# Patient Record
Sex: MALE | Race: BLACK OR AFRICAN AMERICAN | Employment: PART TIME | URBAN - METROPOLITAN AREA
[De-identification: names, ages, dates, MRNs, and addresses within clinical notes are randomized per-mention and may not be internally consistent; named-entity substitution may affect disease eponyms.]

---

## 2018-02-20 ENCOUNTER — AMB VIDEO VISIT (OUTPATIENT)
Dept: OTHER | Facility: HOSPITAL | Age: 23
End: 2018-02-20

## 2018-02-20 PROCEDURE — EVISIT: Performed by: FAMILY MEDICINE

## 2018-02-20 NOTE — CARE ANYWHERE EVISITS
Visit Summary for [facility] - [place]  [place] - Gender: Male - Date of Birth: [id number]  Date: [id number] - Duration: 13 minutes  Patient: ZAHIRA VÁSQUEZ  Provider: Lizzette Soares    Patient Contact Information  Address  [place]; [address]  [phone]    Visit Topics  UTI symptoms [Added By: Self - 2018-02-20]    Conversation Transcripts     [Notification] You are connected with Lizzette Soarse, Family Physician  [Notification] ZAHIRA VÁSQUEZ is located in [place]  [Notification] ZAHIRA VÁSQUEZ has shared health history         Diagnosis    Procedures  Value: [id number] Code: CPT-4 UNLISTED E&M SERVICE    Medications Prescribed    No prescriptions ordered    Electronically signed by: Kye De Leon(NPI [id number])

## 2018-05-12 ENCOUNTER — APPOINTMENT (EMERGENCY)
Dept: RADIOLOGY | Facility: HOSPITAL | Age: 23
End: 2018-05-12

## 2018-05-12 ENCOUNTER — HOSPITAL ENCOUNTER (EMERGENCY)
Facility: HOSPITAL | Age: 23
Discharge: HOME | End: 2018-05-12
Attending: EMERGENCY MEDICINE | Admitting: EMERGENCY MEDICINE

## 2018-05-12 VITALS
HEART RATE: 71 BPM | WEIGHT: 185 LBS | OXYGEN SATURATION: 99 % | SYSTOLIC BLOOD PRESSURE: 126 MMHG | RESPIRATION RATE: 20 BRPM | DIASTOLIC BLOOD PRESSURE: 68 MMHG | TEMPERATURE: 98.9 F

## 2018-05-12 DIAGNOSIS — S30.1XXA CONTUSION OF FLANK, INITIAL ENCOUNTER: Primary | ICD-10-CM

## 2018-05-12 PROCEDURE — 73502 X-RAY EXAM HIP UNI 2-3 VIEWS: CPT

## 2018-05-12 PROCEDURE — 99283 EMERGENCY DEPT VISIT LOW MDM: CPT

## 2018-05-12 RX ORDER — IBUPROFEN 600 MG/1
600 TABLET ORAL ONCE
Status: COMPLETED | OUTPATIENT
Start: 2018-05-12 | End: 2018-05-12

## 2018-05-12 RX ORDER — ACETAMINOPHEN 325 MG/1
650 TABLET ORAL ONCE
Status: COMPLETED | OUTPATIENT
Start: 2018-05-12 | End: 2018-05-12

## 2018-05-12 RX ADMIN — ACETAMINOPHEN 650 MG: 325 TABLET, FILM COATED ORAL at 20:18

## 2018-05-12 RX ADMIN — IBUPROFEN 600 MG: 600 TABLET ORAL at 20:18

## 2018-05-12 NOTE — ED PROVIDER NOTES
History  Chief Complaint   Patient presents with    Flank Injury     Pt  c/o right sided flank pain after being elbowed in that area by another player while playing basketball around 2 hours ago  25 y/o male presents today c/o right hip pain which started 2 hours ago after he was hit with another player's elbow while playing basketball  C/o pain just above his right hip  States he 'can't walk due to the pain'  History provided by:  Patient  Hip Pain   Location:  Right hip  Severity:  Moderate  Onset quality:  Sudden  Duration:  2 hours  Timing:  Constant  Progression:  Unchanged  Chronicity:  New  Associated symptoms: no abdominal pain, no chest pain, no headaches, no loss of consciousness, no nausea, no rash and no shortness of breath        None       No past medical history on file  No past surgical history on file  No family history on file  I have reviewed and agree with the history as documented  Social History   Substance Use Topics    Smoking status: Never Smoker    Smokeless tobacco: Not on file    Alcohol use No        Review of Systems   Constitutional: Negative for diaphoresis  Respiratory: Negative for shortness of breath  Cardiovascular: Negative for chest pain  Gastrointestinal: Negative for abdominal pain and nausea  Musculoskeletal: Negative for back pain  Skin: Negative for pallor, rash and wound  Neurological: Negative for loss of consciousness and headaches  Physical Exam  ED Triage Vitals [05/12/18 1840]   Temperature Pulse Respirations Blood Pressure SpO2   98 9 °F (37 2 °C) 71 20 126/68 99 %      Temp Source Heart Rate Source Patient Position - Orthostatic VS BP Location FiO2 (%)   Oral -- -- -- --      Pain Score       8           Orthostatic Vital Signs  Vitals:    05/12/18 1840   BP: 126/68   Pulse: 71       Physical Exam   Constitutional: He appears well-developed and well-nourished  HENT:   Head: Normocephalic and atraumatic  Mouth/Throat: Oropharynx is clear and moist    Pulmonary/Chest: Breath sounds normal    Abdominal: Soft  Bowel sounds are normal  He exhibits no mass  There is no tenderness  There is no rebound and no guarding  Musculoskeletal:        Legs:  Patient has a moderate sized contusion over the right iliac crest   There is no crepitus, there is no deformity  He complains of pain with range of motion of the right hip is neurovascularly intact distally  Pelvis is stable  There is no intra-abdominal injury or pain on palpation of the abdomen  Nursing note and vitals reviewed  ED Medications  Medications   acetaminophen (TYLENOL) tablet 650 mg (650 mg Oral Given 5/12/18 2018)   ibuprofen (MOTRIN) tablet 600 mg (600 mg Oral Given 5/12/18 2018)       Diagnostic Studies  Results Reviewed     None                 XR hip/pelv 2-3 vws right    (Results Pending)              Procedures  Procedures       Phone Contacts  ED Phone Contact    ED Course                               MDM  Number of Diagnoses or Management Options  Contusion of flank, initial encounter: new and requires workup  Diagnosis management comments: 8:46 PM  X-ray negative by my read  Likely contusion  Recommend RICE and follow up with PCP as an outpatient         Amount and/or Complexity of Data Reviewed  Tests in the radiology section of CPT®: ordered and reviewed    Risk of Complications, Morbidity, and/or Mortality  Presenting problems: low  Diagnostic procedures: low  Management options: low    Patient Progress  Patient progress: stable    CritCare Time    Disposition  Final diagnoses:   Contusion of flank, initial encounter     Time reflects when diagnosis was documented in both MDM as applicable and the Disposition within this note     Time User Action Codes Description Comment    5/12/2018  8:19 PM Salazar Cook Add [S30 1XXA] Contusion of flank, initial encounter       ED Disposition     ED Disposition Condition Comment    Discharge Jovan Owen discharge to home/self care  Condition at discharge: Stable        Follow-up Information     Follow up With Specialties Details Why Contact Info    your PCP        [facility]  Call to get a PCP if you do not have one [phone]          There are no discharge medications for this patient  No discharge procedures on file      ED Provider  Electronically Signed by           Evan Ferrari DO  05/12/18 7011

## 2018-05-13 NOTE — DISCHARGE INSTRUCTIONS

## 2021-02-16 ENCOUNTER — OFFICE VISIT (OUTPATIENT)
Dept: URGENT CARE | Age: 26
End: 2021-02-16
Payer: COMMERCIAL

## 2021-02-16 VITALS
DIASTOLIC BLOOD PRESSURE: 77 MMHG | RESPIRATION RATE: 18 BRPM | TEMPERATURE: 98.9 F | HEART RATE: 65 BPM | SYSTOLIC BLOOD PRESSURE: 130 MMHG

## 2021-02-16 DIAGNOSIS — Z02.5 SPORTS PHYSICAL: Primary | ICD-10-CM

## 2021-02-16 NOTE — PROGRESS NOTES
[facility]        NAME: Yamil Escalera is a 23 y o  male  : 1995    MRN: [id number]  DATE: 2021  TIME: 2:55 PM    Assessment and Plan   Sports physical [Z02 5]  1  Sports physical           Patient Instructions       Normal physical    Chief Complaint     Chief Complaint   Patient presents with    Annual Exam     self pay college sports physical          History of Present Illness       HPI   Requesting physical examination for sports  No chronic med conditions  Not taking any meds    Review of Systems   Review of Systems   Constitutional: Negative for chills and fever  HENT: Negative for congestion, sore throat and trouble swallowing  Respiratory: Negative for cough, shortness of breath and wheezing  Cardiovascular: Negative for chest pain  Gastrointestinal: Negative for abdominal pain, blood in stool, diarrhea and vomiting  Genitourinary: Negative for difficulty urinating  Musculoskeletal: Negative for back pain, neck pain and neck stiffness  Skin: Negative for color change and wound  Neurological: Negative for dizziness, tremors, seizures, weakness, numbness and headaches  Hematological: Does not bruise/bleed easily  Psychiatric/Behavioral: Negative for confusion, hallucinations, self-injury, sleep disturbance and suicidal ideas  The patient is not nervous/anxious  Current Medications     No current outpatient medications on file  Current Allergies     Allergies as of 2021    (No Known Allergies)            The following portions of the patient's history were reviewed and updated as appropriate: allergies, current medications, past family history, past medical history, past social history, past surgical history and problem list      History reviewed  No pertinent past medical history  History reviewed  No pertinent surgical history  No family history on file  Medications have been verified          Objective   /77 Pulse 65   Temp 98 9 °F (37 2 °C)   Resp 18   No LMP for male patient         Physical Exam     Physical Exam

## 2021-03-31 ENCOUNTER — ATHLETIC TRAINING (OUTPATIENT)
Dept: SPORTS MEDICINE | Facility: OTHER | Age: 26
End: 2021-03-31

## 2021-03-31 DIAGNOSIS — M79.672 FOOT PAIN, LEFT: Primary | ICD-10-CM

## 2021-04-08 NOTE — PROGRESS NOTES
AT Evaluation- Ankle      Start Time: 1600  Stop Time: 1630  Total time in clinic (min): 30 minutes    Assessment  Assessment details: Contusion on the dorsum of the foot  Symptom irritability: lowUnderstanding of Dx/Px/POC: excellentPrognosis details: Should recover fully in 1-3 days  Plan  Planned modality interventions: cryotherapy        Subjective Evaluation    History of Present Illness  Date of onset: 3/30/2021  Mechanism of injury: trauma  Mechanism of injury: Athlete was stepped on or rolled up on at practice last night          Not a recurrent problem   Quality of life: good      Diagnostic Tests  No diagnostic tests performed        Objective     Palpation   Left   No palpable tenderness to the anterior tibialis, lateral gastrocnemius, medial gastrocnemius, peroneus, plantaris, posterior tibialis and soleus  Tenderness   Left Ankle/Foot   Tenderness in the dorsum foot  Additional Tenderness Details  Athlete is tender on the dorsum of the foot in no general muscular path just soft tissue  Active Range of Motion   Left Ankle/Foot   Normal active range of motion    Right Ankle/Foot   Normal active range of motion    Strength/Myotome Testing     Left Ankle/Foot   Normal strength    Right Ankle/Foot   Normal strength    Tests   Left Ankle/Foot   Negative for calcaneal squeeze, Feiss' line and percussion              Precautions: None      Manuals 3-31                                                                Neuro Re-Ed                                                                                                        Ther Ex                                                                                                                     Ther Activity                                       Gait Training                                       Modalities             Game Ready 15 min

## 2024-06-10 ENCOUNTER — HOSPITAL ENCOUNTER (EMERGENCY)
Facility: HOSPITAL | Age: 29
Discharge: HOME | End: 2024-06-10
Attending: EMERGENCY MEDICINE | Admitting: EMERGENCY MEDICINE
Payer: MEDICARE

## 2024-06-10 ENCOUNTER — APPOINTMENT (INPATIENT)
Dept: RADIOLOGY | Facility: HOSPITAL | Age: 29
End: 2024-06-10
Payer: MEDICARE

## 2024-06-10 ENCOUNTER — APPOINTMENT (EMERGENCY)
Dept: RADIOLOGY | Facility: HOSPITAL | Age: 29
End: 2024-06-10
Payer: MEDICARE

## 2024-06-10 VITALS
HEART RATE: 49 BPM | SYSTOLIC BLOOD PRESSURE: 129 MMHG | DIASTOLIC BLOOD PRESSURE: 75 MMHG | OXYGEN SATURATION: 99 % | TEMPERATURE: 98.6 F | RESPIRATION RATE: 18 BRPM

## 2024-06-10 DIAGNOSIS — M27.2 SUBPERIOSTEAL ABSCESS OF JAW: Primary | ICD-10-CM

## 2024-06-10 DIAGNOSIS — K04.7 DENTAL ABSCESS: ICD-10-CM

## 2024-06-10 DIAGNOSIS — L03.211 FACIAL CELLULITIS: ICD-10-CM

## 2024-06-10 LAB
ALBUMIN SERPL BCP-MCNC: 4.3 G/DL (ref 3.5–5)
ALP SERPL-CCNC: 84 U/L (ref 34–104)
ALT SERPL W P-5'-P-CCNC: 10 U/L (ref 7–52)
ANION GAP SERPL CALCULATED.3IONS-SCNC: 7 MMOL/L (ref 4–13)
AST SERPL W P-5'-P-CCNC: 13 U/L (ref 13–39)
BASOPHILS # BLD MANUAL: 0 THOUSAND/UL (ref 0–0.1)
BASOPHILS NFR MAR MANUAL: 0 % (ref 0–1)
BILIRUB SERPL-MCNC: 0.5 MG/DL (ref 0.2–1)
BUN SERPL-MCNC: 5 MG/DL (ref 5–25)
CALCIUM SERPL-MCNC: 9.4 MG/DL (ref 8.4–10.2)
CHLORIDE SERPL-SCNC: 103 MMOL/L (ref 96–108)
CO2 SERPL-SCNC: 28 MMOL/L (ref 21–32)
CREAT SERPL-MCNC: 0.75 MG/DL (ref 0.6–1.3)
EOSINOPHIL # BLD MANUAL: 0 THOUSAND/UL (ref 0–0.4)
EOSINOPHIL NFR BLD MANUAL: 0 % (ref 0–6)
ERYTHROCYTE [DISTWIDTH] IN BLOOD BY AUTOMATED COUNT: 14.2 % (ref 11.6–15.1)
GFR SERPL CREATININE-BSD FRML MDRD: 125 ML/MIN/1.73SQ M
GLUCOSE SERPL-MCNC: 103 MG/DL (ref 65–140)
HCT VFR BLD AUTO: 44.2 % (ref 36.5–49.3)
HGB BLD-MCNC: 14.9 G/DL (ref 12–17)
LYMPHOCYTES # BLD AUTO: 0.9 THOUSAND/UL (ref 0.6–4.47)
LYMPHOCYTES # BLD AUTO: 16 % (ref 14–44)
MCH RBC QN AUTO: 27.6 PG (ref 26.8–34.3)
MCHC RBC AUTO-ENTMCNC: 33.7 G/DL (ref 31.4–37.4)
MCV RBC AUTO: 82 FL (ref 82–98)
MONOCYTES # BLD AUTO: 0.22 THOUSAND/UL (ref 0–1.22)
MONOCYTES NFR BLD: 4 % (ref 4–12)
NEUTROPHILS # BLD MANUAL: 4.48 THOUSAND/UL (ref 1.85–7.62)
NEUTS BAND NFR BLD MANUAL: 1 % (ref 0–8)
NEUTS SEG NFR BLD AUTO: 79 % (ref 43–75)
PLATELET # BLD AUTO: 186 THOUSANDS/UL (ref 149–390)
PLATELET BLD QL SMEAR: ADEQUATE
PMV BLD AUTO: 11.9 FL (ref 8.9–12.7)
POTASSIUM SERPL-SCNC: 3.9 MMOL/L (ref 3.5–5.3)
PROT SERPL-MCNC: 7.5 G/DL (ref 6.4–8.4)
RBC # BLD AUTO: 5.39 MILLION/UL (ref 3.88–5.62)
RBC MORPH BLD: NORMAL
SODIUM SERPL-SCNC: 138 MMOL/L (ref 135–147)
WBC # BLD AUTO: 5.6 THOUSAND/UL (ref 4.31–10.16)

## 2024-06-10 PROCEDURE — 87040 BLOOD CULTURE FOR BACTERIA: CPT

## 2024-06-10 PROCEDURE — 70487 CT MAXILLOFACIAL W/DYE: CPT

## 2024-06-10 PROCEDURE — 99285 EMERGENCY DEPT VISIT HI MDM: CPT | Performed by: EMERGENCY MEDICINE

## 2024-06-10 PROCEDURE — 85007 BL SMEAR W/DIFF WBC COUNT: CPT

## 2024-06-10 PROCEDURE — 99284 EMERGENCY DEPT VISIT MOD MDM: CPT

## 2024-06-10 PROCEDURE — 80053 COMPREHEN METABOLIC PANEL: CPT

## 2024-06-10 PROCEDURE — 96365 THER/PROPH/DIAG IV INF INIT: CPT

## 2024-06-10 PROCEDURE — 70355 PANORAMIC X-RAY OF JAWS: CPT

## 2024-06-10 PROCEDURE — 36415 COLL VENOUS BLD VENIPUNCTURE: CPT

## 2024-06-10 PROCEDURE — 85027 COMPLETE CBC AUTOMATED: CPT

## 2024-06-10 RX ORDER — KETOROLAC TROMETHAMINE 30 MG/ML
15 INJECTION, SOLUTION INTRAMUSCULAR; INTRAVENOUS ONCE
Status: COMPLETED | OUTPATIENT
Start: 2024-06-10 | End: 2024-06-10

## 2024-06-10 RX ORDER — ACETAMINOPHEN 325 MG/1
975 TABLET ORAL ONCE
Status: COMPLETED | OUTPATIENT
Start: 2024-06-10 | End: 2024-06-10

## 2024-06-10 RX ADMIN — IOHEXOL 100 ML: 350 INJECTION, SOLUTION INTRAVENOUS at 12:20

## 2024-06-10 RX ADMIN — SODIUM CHLORIDE 3 G: 9 INJECTION, SOLUTION INTRAVENOUS at 10:55

## 2024-06-10 RX ADMIN — KETOROLAC TROMETHAMINE 15 MG: 30 INJECTION, SOLUTION INTRAMUSCULAR; INTRAVENOUS at 14:56

## 2024-06-10 RX ADMIN — ACETAMINOPHEN 975 MG: 325 TABLET, FILM COATED ORAL at 10:55

## 2024-06-10 NOTE — ED PROVIDER NOTES
History  Chief Complaint   Patient presents with    Facial Swelling     Pt c/o left sided facial swelling/pain. Pt states this has happened in the past and received abx to drain abscess      29-year-old male no significant past medical history presents ED complaining of left facial pain and swelling.  Pain began last night, located to left cheek, related to broken tooth.  Patient has been applying Orajel to the inside of the mouth with mild relief of pain.  Patient tells me he has had similar presentation in the past, was prescribed oral antibiotics which cleared up pain infection has not had issues since.  Denies feeling feverish or having chills.  No systemic signs of illness specifically no nausea vomiting no headache, no diarrhea or constipation no abdominal pain. Patient has not taken any antipyretics on oral analgesics OTC. Has not seen a dentist in over a year.         None       No past medical history on file.    No past surgical history on file.    No family history on file.  I have reviewed and agree with the history as documented.    E-Cigarette/Vaping     E-Cigarette/Vaping Substances     Social History     Tobacco Use    Smoking status: Never   Substance Use Topics    Alcohol use: No    Drug use: No        Review of Systems   Constitutional:  Negative for chills and fever.   HENT:  Positive for dental problem and facial swelling. Negative for ear pain and sore throat.    Eyes:  Negative for pain and visual disturbance.   Respiratory:  Negative for cough and shortness of breath.    Cardiovascular:  Negative for chest pain and palpitations.   Gastrointestinal:  Negative for abdominal pain and vomiting.   Genitourinary:  Negative for dysuria and hematuria.   Musculoskeletal:  Negative for arthralgias and back pain.   Skin:  Negative for color change and rash.   Neurological:  Negative for seizures and syncope.   All other systems reviewed and are negative.      Physical Exam  ED Triage Vitals    Temperature Pulse Respirations Blood Pressure SpO2   06/10/24 1003 06/10/24 1003 06/10/24 1003 06/10/24 1003 06/10/24 1003   (!) 101.4 °F (38.6 °C) 62 18 152/91 98 %      Temp Source Heart Rate Source Patient Position - Orthostatic VS BP Location FiO2 (%)   06/10/24 1003 06/10/24 1003 06/10/24 1100 06/10/24 1003 --   Oral Monitor Lying Left arm       Pain Score       06/10/24 1003       7             Orthostatic Vital Signs  Vitals:    06/10/24 1100 06/10/24 1245 06/10/24 1300 06/10/24 1512   BP: 150/99 157/89 158/95 129/75   Pulse: (!) 51 (!) 51 (!) 45 (!) 49   Patient Position - Orthostatic VS: Lying Lying Lying Lying       Physical Exam  Vitals and nursing note reviewed.   Constitutional:       General: He is not in acute distress.     Appearance: He is well-developed.   HENT:      Head: No masses.      Jaw: No trismus.        Comments: Soft submandibular space.      Mouth/Throat:      Dentition: Abnormal dentition. Dental caries present. No dental tenderness.      Comments: Numerous teeth is state of overt decay, missing with R upper teeth caked in plaque.     L upper mucobuccal fold tender to palpation mildly erythematous without fluctuance. No obvious intraoral or periapical abscess.     No tenderness to palpation of the remaining teeth in the L upper     Eyes:      Extraocular Movements: Extraocular movements intact.      Conjunctiva/sclera: Conjunctivae normal.   Cardiovascular:      Rate and Rhythm: Normal rate and regular rhythm.      Heart sounds: No murmur heard.  Pulmonary:      Effort: Pulmonary effort is normal. No respiratory distress.      Breath sounds: Normal breath sounds.   Abdominal:      Palpations: Abdomen is soft.      Tenderness: There is no abdominal tenderness.   Musculoskeletal:         General: No swelling.      Cervical back: Neck supple.   Skin:     General: Skin is warm and dry.      Capillary Refill: Capillary refill takes less than 2 seconds.   Neurological:      Mental Status:  He is alert.   Psychiatric:         Mood and Affect: Mood normal.         ED Medications  Medications   acetaminophen (TYLENOL) tablet 975 mg (975 mg Oral Given 6/10/24 1055)   ampicillin-sulbactam (UNASYN) 3 g in sodium chloride 0.9 % 100 mL IVPB (0 g Intravenous Stopped 6/10/24 1125)   iohexol (OMNIPAQUE) 350 MG/ML injection (MULTI-DOSE) 100 mL (100 mL Intravenous Given 6/10/24 1220)   ketorolac (TORADOL) injection 15 mg (15 mg Intravenous Given 6/10/24 1456)       Diagnostic Studies  Results Reviewed       Procedure Component Value Units Date/Time    Blood culture [[id number]] Collected: 06/10/24 1046    Lab Status: Preliminary result Specimen: Blood from Arm, Left Updated: 06/10/24 1701     Blood Culture Received in Microbiology Lab. Culture in Progress.    Blood culture [[id number]] Collected: 06/10/24 1046    Lab Status: Preliminary result Specimen: Blood from Arm, Right Updated: 06/10/24 1701     Blood Culture Received in Microbiology Lab. Culture in Progress.    RBC Morphology Reflex Test [[id number]] Collected: 06/10/24 1046    Lab Status: Final result Specimen: Blood from Arm, Left Updated: 06/10/24 1201    CBC and differential [[id number]]  (Normal) Collected: 06/10/24 1046    Lab Status: Final result Specimen: Blood from Arm, Left Updated: 06/10/24 1136     WBC 5.60 Thousand/uL      RBC 5.39 Million/uL      Hemoglobin 14.9 g/dL      Hematocrit 44.2 %      MCV 82 fL      MCH 27.6 pg      MCHC 33.7 g/dL      RDW 14.2 %      MPV 11.9 fL      Platelets 186 Thousands/uL     Narrative:      This is an appended report.  These results have been appended to a previously verified report.    Manual Differential(PHLEBS Do Not Order) [[id number]]  (Abnormal) Collected: 06/10/24 1046    Lab Status: Final result Specimen: Blood from Arm, Left Updated: 06/10/24 1136     Segmented % 79 %      Bands % 1 %      Lymphocytes % 16 %      Monocytes % 4 %      Eosinophils % 0 %      Basophils % 0 %      Absolute Neutrophils 4.48  Thousand/uL      Absolute Lymphocytes 0.90 Thousand/uL      Absolute Monocytes 0.22 Thousand/uL      Absolute Eosinophils 0.00 Thousand/uL      Absolute Basophils 0.00 Thousand/uL      Total Counted --     RBC Morphology Normal     Platelet Estimate Adequate    Comprehensive metabolic panel [[id number]] Collected: 06/10/24 1046    Lab Status: Final result Specimen: Blood from Arm, Left Updated: 06/10/24 1116     Sodium 138 mmol/L      Potassium 3.9 mmol/L      Chloride 103 mmol/L      CO2 28 mmol/L      ANION GAP 7 mmol/L      BUN 5 mg/dL      Creatinine 0.75 mg/dL      Glucose 103 mg/dL      Calcium 9.4 mg/dL      AST 13 U/L      ALT 10 U/L      Alkaline Phosphatase 84 U/L      Total Protein 7.5 g/dL      Albumin 4.3 g/dL      Total Bilirubin 0.50 mg/dL      eGFR 125 ml/min/1.73sq m     Narrative:      National Kid[employer] Disease Foundation guidelines for Chronic Kidney Disease (CKD):     Stage 1 with normal or high GFR (GFR > 90 mL/min/1.73 square meters)    Stage 2 Mild CKD (GFR = 60-89 mL/min/1.73 square meters)    Stage 3A Moderate CKD (GFR = 45-59 mL/min/1.73 square meters)    Stage 3B Moderate CKD (GFR = 30-44 mL/min/1.73 square meters)    Stage 4 Severe CKD (GFR = 15-29 mL/min/1.73 square meters)    Stage 5 End Stage CKD (GFR <15 mL/min/1.73 square meters)  Note: GFR calculation is accurate only with a steady state creatinine                   XR mandible panorex ([facility] only)   Final Result by Jimy Vicente MD (06/10 1741)      No fracture.      Multiple bilateral dental caries. There are periapical cysts involving several left mandible and maxillary molars.            Workstation performed: [id number]         CT facial bones with contrast   Final Result by Williams Martínez DO (06/10 1312)      Multiple cavities and periapical lucencies throughout the maxillary and mandibular dentition. Tooth #13 appears to be the source of acute dental infection with a small overlying subperiosteal abscess  identified immediately superficial to the left aspect    of the maxilla on image 28 of axial imaging. There is mild left facial cellulitis and myositis.      Sinus disease most prominent within the left maxillary sinus.         Workstation performed: [id number]               Procedures  Procedures      ED Course  ED Course as of 06/11/24 1231   Mon Isma 10, 2024   1320 O[facility] consulted   1339 Contacted [facility] for admission    1343 Per omfs, patient may be able to be discharged directly to OMFS office for management. Will update in 10 min after reviewing scan. Will cancel inpt admission order for now.    1406 Omfs resident at bedside   1417 Per [facility], patient can be accommodation in the clinic immediately today. No need for admission in setting of maxilla subperiosteal abscess                                       Medical Decision Making  29-year-old male no significant past medical history presents ED complaining of left facial pain and swelling.      Ddx: Periapical abscess, maxillary osteomyelitis vs dental abscess.    Patient febrile but in no acute distress on my examination.   Will obtain labs, CT facial bones with IV contrast to evaluate for the above.  Will empirically Unasyn for concern of extension of dental infection.    Case discussed with OMFS on-call, in setting of maxillary subperiosteal abscess, no need for hospital admission.  Can be managed on an outpatient basis.  OMFS has full ability to see patient on an emergent basis mediately.  OMFS resident presented to bedside, will prescribe antibiotics and treat patient immediately in clinic.  Patient discharged to    Amount and/or Complexity of Data Reviewed  Labs: ordered.  Radiology: ordered.    Risk  OTC drugs.  Prescription drug management.          Disposition  Final diagnoses:   Subperiosteal abscess of jaw   Facial cellulitis   Dental abscess     Time reflects when diagnosis was documented in both MDM as applicable and the Disposition within this note        Time User Action Codes Description Comment    6/10/2024  1:33 PM Bel Pineda [M27.2] Subperiosteal abscess of jaw     6/10/2024  1:33 PM Bel Pineda [L03.211] Facial cellulitis     6/10/2024  1:33 PM Bel Pineda [K04.7] Dental abscess           ED Disposition       ED Disposition   Discharge    Condition   Stable    Date/Time   Mon Isma 10, 2024  2:17 PM    Comment                  Follow-up Information       Follow up With Specialties Details Why Contact Info    Chidi Moffett DMD Oral Maxillofacial Surgery Schedule an appointment as soon as possible for a visit   [address] [place] [address]  [phone]              There are no discharge medications for this patient.    No discharge procedures on file.    PDMP Review       None             ED Provider  Attending physically available and evaluated Jovan Owen. I managed the patient along with the ED Attending.    Electronically Signed by           Bel Pineda MD  06/11/24 7955

## 2024-06-10 NOTE — ED ATTENDING ATTESTATION
6/10/2024  I, Mar Varela MD, saw and evaluated the patient. I have discussed the patient with the resident/non-physician practitioner and agree with the resident's/non-physician practitioner's findings, Plan of Care, and MDM as documented in the resident's/non-physician practitioner's note, except where noted. All available labs and Radiology studies were reviewed.  I was present for key portions of any procedure(s) performed by the resident/non-physician practitioner and I was immediately available to provide assistance.       At this point I agree with the current assessment done in the Emergency Department.  I have conducted an independent evaluation of this patient a history and physical is as follows:  Left-sided facial pain and swelling.  Patient with history of poor dentition, states that he has had 24 hours of progressive swelling and pain above his left upper gumline.  Had fever on arrival, but states he has not felt febrile.  No immunosuppression.  No facial gaze palsy.  Review of systems otherwise -12 systems reviewed.  On exam the patient has a bit of swelling overlying his left cheek.  His extraocular movements are intact.  He has no disconjugate gaze.  He has no trismus.  The floor of the mouth is soft.  He has poor dentition.  He has some soft tissue swelling at the reflection of his buccal and gingival mucosa, no discrete abscess.MEDICAL DECISION MAKING    Number and Complexity of Problems  Differential diagnosis: Facial infection, fever, abscess    Medical Decision Making Data  External documents reviewed:   My EKG interpretation:   My CT interpretation:   My X-ray interpretation:   My ultrasound interpretation:     CT facial bones with contrast   Final Result      Multiple cavities and periapical lucencies throughout the maxillary and mandibular dentition. Tooth #13 appears to be the source of acute dental infection with a small overlying subperiosteal abscess identified immediately  superficial to the left aspect    of the maxilla on image 28 of axial imaging. There is mild left facial cellulitis and myositis.      Sinus disease most prominent within the left maxillary sinus.         Workstation performed: [id number]         XR mandible panorex ([place] only)    (Results Pending)       Labs Reviewed   MANUAL DIFFERENTIAL(PHLEBS DO NOT ORDER) - Abnormal       Result Value Ref Range Status    Segmented % 79 (*) 43 - 75 % Final    Bands % 1  0 - 8 % Final    Lymphocytes % 16  14 - 44 % Final    Monocytes % 4  4 - 12 % Final    Eosinophils % 0  0 - 6 % Final    Basophils % 0  0 - 1 % Final    Absolute Neutrophils 4.48  1.85 - 7.62 Thousand/uL Final    Absolute Lymphocytes 0.90  0.60 - 4.47 Thousand/uL Final    Absolute Monocytes 0.22  0.00 - 1.22 Thousand/uL Final    Absolute Eosinophils 0.00  0.00 - 0.40 Thousand/uL Final    Absolute Basophils 0.00  0.00 - 0.10 Thousand/uL Final    Total Counted     Final    RBC Morphology Normal   Final    Platelet Estimate Adequate  Adequate Final   CBC AND DIFFERENTIAL - Normal    WBC 5.60  4.31 - 10.16 Thousand/uL Final    RBC 5.39  3.88 - 5.62 Million/uL Final    Hemoglobin 14.9  12.0 - 17.0 g/dL Final    Hematocrit 44.2  36.5 - 49.3 % Final    MCV 82  82 - 98 fL Final    MCH 27.6  26.8 - 34.3 pg Final    MCHC 33.7  31.4 - 37.4 g/dL Final    RDW 14.2  11.6 - 15.1 % Final    MPV 11.9  8.9 - 12.7 fL Final    Platelets 186  149 - 390 Thousands/uL Final    Narrative:     This is an appended report.  These results have been appended to a previously verified report.   BLOOD CULTURE   BLOOD CULTURE   COMPREHENSIVE METABOLIC PANEL    Sodium 138  135 - 147 mmol/L Final    Potassium 3.9  3.5 - 5.3 mmol/L Final    Chloride 103  96 - 108 mmol/L Final    CO2 28  21 - 32 mmol/L Final    ANION GAP 7  4 - 13 mmol/L Final    BUN 5  5 - 25 mg/dL Final    Creatinine 0.75  0.60 - 1.30 mg/dL Final    Comment: Standardized to IDMS reference method    Glucose 103  65 - 140  mg/dL Final    Comment: If the patient is fasting, the ADA then defines impaired fasting glucose as > 100 mg/dL and diabetes as > or equal to 123 mg/dL.    Calcium 9.4  8.4 - 10.2 mg/dL Final    AST 13  13 - 39 U/L Final    ALT 10  7 - 52 U/L Final    Comment: Specimen collection should occur prior to Sulfasalazine administration due to the potential for falsely depressed results.     Alkaline Phosphatase 84  34 - 104 U/L Final    Total Protein 7.5  6.4 - 8.4 g/dL Final    Albumin 4.3  3.5 - 5.0 g/dL Final    Total Bilirubin 0.50  0.20 - 1.00 mg/dL Final    Comment: Use of this assay is not recommended for patients undergoing treatment with eltrombopag due to the potential for falsely elevated results.  N-acetyl-p-benzoquinone imine (metabolite of Acetaminophen) will generate erroneously low results in samples for patients that have taken an overdose of Acetaminophen.    eGFR 125  ml/min/1.73sq m Final    Narrative:     National Kidney Disease Foundation guidelines for Chronic Kidney Disease (CKD):     Stage 1 with normal or high GFR (GFR > 90 mL/min/1.73 square meters)    Stage 2 Mild CKD (GFR = 60-89 mL/min/1.73 square meters)    Stage 3A Moderate CKD (GFR = 45-59 mL/min/1.73 square meters)    Stage 3B Moderate CKD (GFR = 30-44 mL/min/1.73 square meters)    Stage 4 Severe CKD (GFR = 15-29 mL/min/1.73 square meters)    Stage 5 End Stage CKD (GFR <15 mL/min/1.73 square meters)  Note: GFR calculation is accurate only with a steady state creatinine   RBC MORPHOLOGY REFLEX TEST       Labs reviewed by me are significant for:     Clinical decision rules/scores are significant for:     Discussed case with:   Considered admission for:     Treatment and Disposition  ED course: Seen and examined.  Will CT, antibiotics.  Call placed to [facility], they will see the patient today  Shared decision making:   Code status:     ED Course         Critical Care Time  Procedures

## 2024-06-10 NOTE — CONSULTS
Patient Name: Jovan Owen YOB: 1995    Medical Record No.: [id number]     Admit/Registration Date: 6/10/2024 10:15 AM  Date of Consult: 06/10/24      Oral and Maxillofacial Surgery Consult Note    Assessment:  29 y.o. male with multiple carious teeth, and subperiosteal abscess a/w teeth #13,and 14     Plan/Recs:  - [facility] extraction, I&D with Dr. Moffett.   - NPO, with escort   - Augmentin 875mg BID x 7 days   Follow up with [facility]. Immediately after discharge.   [address]  [address] [id number]         -----------------------------------------    Chief Complaint:  Pain and swelling of the left face.     HPI:  29 y.o. male who presents with pain and swelling a/w a tooth ache in the UL jaw. Pt reports 6/10 pain in the UL jaw for 2 days with acute onset of swelling yesterday. Pt endorses chills, fever 101.4 now 98.6, denies, SOB, CP, N,V, dysphagia.VSS         Pre-admission records reviewed. PMH/PSH/Meds/Allergies Reviewed.    No past medical history on file.  No past surgical history on file.    No Known Allergies    Social History     Socioeconomic History    Marital status: Single     Spouse name: Not on file    Number of children: Not on file    Years of education: Not on file    Highest education level: Not on file   Occupational History    Not on file   Tobacco Use    Smoking status: Never    Smokeless tobacco: Not on file   Substance and Sexual Activity    Alcohol use: No    Drug use: No    Sexual activity: Not on file   Other Topics Concern    Not on file   Social History Narrative    Not on file     Social Determinants of Health     Financial Resource Strain: Not on file   Food Insecurity: Not on file   Transportation Needs: Not on file   Physical Activity: Not on file   Stress: Not on file   Social Connections: Not on file   Intimate Partner Violence: Not on file   Housing Stability: Not on file       Scheduled  Medications      PRN Medications      Medication Infusions  No current facility-administered medications for this encounter.      Review of Systems    Vitals:    Temp:  [98.6 °F (37 °C)-101.4 °F (38.6 °C)] 98.6 °F (37 °C)  HR:  [45-62] 45  Resp:  [18] 18  BP: (150-158)/(89-99) 158/95  Wt Readings from Last 1 Encounters:   05/12/18 83.9 kg (185 lb)     Ht Readings from Last 1 Encounters:   No data found for Ht     There is no height or weight on file to calculate BMI.  Respiratory      Lab Data (Last 4 hours)      None           O2/Vent Data (Last 4 hours)      None                  Patient Lines/Drains/Airways Status       Active Airway       None                  I/O:  Current Diet Order:      No intake or output data in the 24 hours ending 06/10/24 1338    Labs:  Results from last 7 days   Lab Units 06/10/24  1046   WBC Thousand/uL 5.60   HEMOGLOBIN g/dL 14.9   HEMATOCRIT % 44.2   PLATELETS Thousands/uL 186     Results from last 7 days   Lab Units 06/10/24  1046   POTASSIUM mmol/L 3.9   CHLORIDE mmol/L 103   CO2 mmol/L 28   BUN mg/dL 5   CREATININE mg/dL 0.75   CALCIUM mg/dL 9.4             Pain Management Panel           No data to display                Cultures and Sensitivites:  Blood cultures pending.   Imaging:   CT: face  IMPRESSION:     Multiple cavities and periapical lucencies throughout the maxillary and mandibular dentition. Tooth #13 appears to be the source of acute dental infection with a small overlying subperiosteal abscess identified immediately superficial to the left aspect   of the maxilla on image 28 of axial imaging. There is mild left facial cellulitis and myositis.     Sinus disease most prominent within the left maxillary sinus.       Physical Exam:   General: Integment: skin warm and dry, patient is WD/WN, Voice quality: normal, in NAD  Neuro Exam: AAO x 3, CN V,VII grossly intact   Head: Normocephalic, no scalp lacerations or hematoma   Face: mild left midface swelling that is soft and  +TTP. No lacerations/Abrasions/Step Offs    Ears: Pinna wnl bilaterally, no otorrhea, hearing grossly intact   Eyes/Periorbital:  PERRLA, EOMI , intercanthal distance wnl   Nose: External nose symmetrical/no gross deformity, no nasal crepitus, no nasal septal hematoma, no rhinorrhea, no epistaxis, bilateral nares patent   Oral Exam: RADHA 40mm, lateral excursive movements wnl, fully dentate with multiple carious teeth (#13,14,16,18,31), + vestibular swelling in the left max buccal vestibule a/w teeth #13, and 14 that are ttp.    floor of mouth is soft with no palpable masses, tongue protrusion is midline and has full range of motion, no pharyngeal edema or exudate   Lymph/Neck Exam: Neck is soft, trachea is midline, no gross cervical lymphadenopathy bilaterally     Bruno Martinez, STEPHANIE

## 2024-06-10 NOTE — Clinical Note
Case was discussed with Dr. Braden and the patient's admission status was agreed to be Admission Status: inpatient status to the service of Dr. Braden .

## 2024-06-10 NOTE — ED NOTES
Patient and significant other aware of discharge plan and Dr. Pineda at bedside to facilitate appt     Shirley Coleman RN  06/10/24 1816

## 2024-06-11 NOTE — UTILIZATION REVIEW
NOTIFICATION OF ADMISSION DISCHARGE   This is a Notification of Discharge from [facility]. Please be advised that this patient has been discharge from our facility. Below you will find the admission and discharge date and time including the patient’s disposition.   UTILIZATION REVIEW CONTACT:  Marquis Fischer  Utilization   Network Utilization Review Department  Phone: [phone] x carefully listen to the prompts. All voicemails are confidential.  Email: [email or web address]     ADMISSION INFORMATION  PRESENTATION DATE: 6/10/2024 10:15 AM  OBERVATION ADMISSION DATE:   INPATIENT ADMISSION DATE: 6/10/24  1:41 PM   DISCHARGE DATE: 6/10/2024  3:22 PM   DISPOSITION:Home/Self Care    Network Utilization Review Department  ATTENTION: Please call with any questions or concerns to [phone] and carefully listen to the prompts so that you are directed to the right person. All voicemails are confidential.   For Discharge needs, contact Care Management DC Support Team at [phone] opt. 2  Send all requests for admission clinical reviews, approved or denied determinations and any other requests to dedicated fax number below belonging to the campus where the patient is receiving treatment. List of dedicated fax numbers for the Facilities:  FACILITY NAME UR FAX NUMBER   ADMISSION DENIALS (Administrative/Medical Necessity) [phone]   DISCHARGE SUPPORT TEAM ([facility]) [phone]   PARENT CHILD HEALTH (Maternity/NICU/Pediatrics) [phone]   [facility] [phone]   [facility] [phone]   [facility] [phone]   [facility] [phone]   [facility] [phone]   [facility] [phone]   [facility] [phone]   [facility] [phone]   [facility]  [facility] [phone]   [facility] [phone]   [facility] [phone]   [facility] [phone]

## 2024-06-15 LAB
BACTERIA BLD CULT: NORMAL
BACTERIA BLD CULT: NORMAL